# Patient Record
Sex: FEMALE | Race: WHITE | URBAN - METROPOLITAN AREA
[De-identification: names, ages, dates, MRNs, and addresses within clinical notes are randomized per-mention and may not be internally consistent; named-entity substitution may affect disease eponyms.]

---

## 2022-02-24 ENCOUNTER — OFFICE VISIT (OUTPATIENT)
Dept: INTERNAL MEDICINE CLINIC | Facility: CLINIC | Age: 27
End: 2022-02-24
Payer: MEDICAID

## 2022-02-24 VITALS
SYSTOLIC BLOOD PRESSURE: 132 MMHG | HEIGHT: 66 IN | DIASTOLIC BLOOD PRESSURE: 80 MMHG | HEART RATE: 80 BPM | TEMPERATURE: 98 F | BODY MASS INDEX: 21.73 KG/M2 | OXYGEN SATURATION: 100 % | WEIGHT: 135.19 LBS | RESPIRATION RATE: 16 BRPM

## 2022-02-24 DIAGNOSIS — Z01.84 IMMUNITY STATUS TESTING: Primary | ICD-10-CM

## 2022-02-24 DIAGNOSIS — Z11.1 SCREENING FOR TUBERCULOSIS: ICD-10-CM

## 2022-02-24 DIAGNOSIS — Z00.00 PREVENTATIVE HEALTH CARE: ICD-10-CM

## 2022-02-24 PROCEDURE — 3008F BODY MASS INDEX DOCD: CPT | Performed by: INTERNAL MEDICINE

## 2022-02-24 PROCEDURE — 3079F DIAST BP 80-89 MM HG: CPT | Performed by: INTERNAL MEDICINE

## 2022-02-24 PROCEDURE — 3075F SYST BP GE 130 - 139MM HG: CPT | Performed by: INTERNAL MEDICINE

## 2022-02-24 PROCEDURE — 99203 OFFICE O/P NEW LOW 30 MIN: CPT | Performed by: INTERNAL MEDICINE

## 2022-02-24 NOTE — PATIENT INSTRUCTIONS
- Screening lab orders entered  - MMR, Hepatitis B, Varicella titers ordered  - Quantiferon Gold (Tuberculosis screening blood test) ordered as well  - Sign up for [facility] so you can get the results online  - Follow up with our gynecologists to discuss pap smear. It was a pleasure seeing you in the clinic today. Thank you for choosing the [facility] office for your healthcare needs. Please call at [phone] with any questions or concerns.     Gemma Baig MD

## 2022-02-26 LAB
ABSOLUTE BASOPHILS: 56 CELLS/UL (ref 0–200)
ABSOLUTE EOSINOPHILS: 103 CELLS/UL (ref 15–500)
ABSOLUTE LYMPHOCYTES: 2707 CELLS/UL (ref 850–3900)
ABSOLUTE MONOCYTES: 498 CELLS/UL (ref 200–950)
ABSOLUTE NEUTROPHILS: 6035 CELLS/UL (ref 1500–7800)
ALBUMIN/GLOBULIN RATIO: 1.5 (CALC) (ref 1–2.5)
ALBUMIN: 4.6 G/DL (ref 3.6–5.1)
ALKALINE PHOSPHATASE: 62 U/L (ref 31–125)
ALT: 12 U/L (ref 6–29)
AST: 15 U/L (ref 10–30)
BASOPHILS: 0.6 %
BILIRUBIN, TOTAL: 0.5 MG/DL (ref 0.2–1.2)
BUN: 12 MG/DL (ref 7–25)
CALCIUM: 10 MG/DL (ref 8.6–10.2)
CARBON DIOXIDE: 25 MMOL/L (ref 20–32)
CHLORIDE: 102 MMOL/L (ref 98–110)
CHOL/HDLC RATIO: 2.9 (CALC)
CHOLESTEROL, TOTAL: 204 MG/DL
CREATININE: 0.63 MG/DL (ref 0.5–1.1)
EGFR IF AFRICN AM: 144 ML/MIN/1.73M2
EGFR IF NONAFRICN AM: 125 ML/MIN/1.73M2
EOSINOPHILS: 1.1 %
GLOBULIN: 3.1 G/DL (CALC) (ref 1.9–3.7)
GLUCOSE: 71 MG/DL (ref 65–99)
HDL CHOLESTEROL: 70 MG/DL
HEMATOCRIT: 37.2 % (ref 35–45)
HEMOGLOBIN: 12.4 G/DL (ref 11.7–15.5)
LDL-CHOLESTEROL: 117 MG/DL (CALC)
LYMPHOCYTES: 28.8 %
MCH: 27.4 PG (ref 27–33)
MCHC: 33.3 G/DL (ref 32–36)
MCV: 82.3 FL (ref 80–100)
MEASLES AB (IGG), DIAGNOSTIC: <13.5 AU/ML
MITOGEN-NIL: >10 IU/ML
MONOCYTES: 5.3 %
MPV: 11.7 FL (ref 7.5–12.5)
MUMPS VIRUS AB (IGG), DIAGNOSTIC: 13.2 AU/ML
NEUTROPHILS: 64.2 %
NIL: 0.05 IU/ML
NON-HDL CHOLESTEROL: 134 MG/DL (CALC)
PLATELET COUNT: 336 THOUSAND/UL (ref 140–400)
POTASSIUM: 3.7 MMOL/L (ref 3.5–5.3)
PROTEIN, TOTAL: 7.7 G/DL (ref 6.1–8.1)
QUANTIFERON(R)-TB GOLD PLUS, 1 TUBE: NEGATIVE
RDW: 13.3 % (ref 11–15)
RED BLOOD CELL COUNT: 4.52 MILLION/UL (ref 3.8–5.1)
RUBELLA ANTIBODY (IGG) DIAGNOSTIC: 1.97 INDEX
RUBELLA ANTIBODY (IGM): <20 AU/ML
SODIUM: 137 MMOL/L (ref 135–146)
TB1-NIL: 0.01 IU/ML
TB2-NIL: 0.01 IU/ML
TRIGLYCERIDES: 73 MG/DL
TSH W/REFLEX TO FT4: 1.3 MIU/L
VARICELLA ZOSTER VIRUS$AB (IGG): <135 INDEX
WHITE BLOOD CELL COUNT: 9.4 THOUSAND/UL (ref 3.8–10.8)